# Patient Record
Sex: MALE | Race: OTHER
[De-identification: names, ages, dates, MRNs, and addresses within clinical notes are randomized per-mention and may not be internally consistent; named-entity substitution may affect disease eponyms.]

---

## 2023-04-30 ENCOUNTER — HOSPITAL ENCOUNTER (EMERGENCY)
Dept: HOSPITAL 54 - ER | Age: 53
Discharge: NURSING, REHAB OR LONG-TERM CARE | End: 2023-04-30
Payer: COMMERCIAL

## 2023-04-30 VITALS — HEIGHT: 67 IN | WEIGHT: 184 LBS | BODY MASS INDEX: 28.88 KG/M2

## 2023-04-30 VITALS — SYSTOLIC BLOOD PRESSURE: 148 MMHG | DIASTOLIC BLOOD PRESSURE: 100 MMHG

## 2023-04-30 DIAGNOSIS — G40.909: Primary | ICD-10-CM

## 2023-04-30 DIAGNOSIS — F20.9: ICD-10-CM

## 2023-04-30 DIAGNOSIS — K21.9: ICD-10-CM

## 2023-04-30 DIAGNOSIS — F31.9: ICD-10-CM

## 2023-04-30 LAB
ALBUMIN SERPL BCP-MCNC: 3.4 G/DL (ref 3.4–5)
ALP SERPL-CCNC: 123 U/L (ref 46–116)
ALT SERPL W P-5'-P-CCNC: 25 U/L (ref 12–78)
AST SERPL W P-5'-P-CCNC: 34 U/L (ref 15–37)
BASOPHILS # BLD AUTO: 0 K/UL (ref 0–0.2)
BASOPHILS NFR BLD AUTO: 0.5 % (ref 0–2)
BILIRUB DIRECT SERPL-MCNC: 0.2 MG/DL (ref 0–0.2)
BILIRUB SERPL-MCNC: 0.8 MG/DL (ref 0.2–1)
BUN SERPL-MCNC: 16 MG/DL (ref 7–18)
CALCIUM SERPL-MCNC: 9.2 MG/DL (ref 8.5–10.1)
CHLORIDE SERPL-SCNC: 107 MMOL/L (ref 98–107)
CO2 SERPL-SCNC: 18 MMOL/L (ref 21–32)
CREAT SERPL-MCNC: 1.2 MG/DL (ref 0.6–1.3)
EOSINOPHIL NFR BLD AUTO: 1.8 % (ref 0–6)
ETHANOL SERPL-MCNC: < 3 MG/DL (ref 0–0)
GLUCOSE SERPL-MCNC: 127 MG/DL (ref 74–106)
HCT VFR BLD AUTO: 40 % (ref 39–51)
HGB BLD-MCNC: 13.1 G/DL (ref 13.5–17.5)
LYMPHOCYTES NFR BLD AUTO: 2.1 K/UL (ref 0.8–4.8)
LYMPHOCYTES NFR BLD AUTO: 33.9 % (ref 20–44)
MCHC RBC AUTO-ENTMCNC: 33 G/DL (ref 31–36)
MCV RBC AUTO: 93 FL (ref 80–96)
MONOCYTES NFR BLD AUTO: 0.7 K/UL (ref 0.1–1.3)
MONOCYTES NFR BLD AUTO: 11.4 % (ref 2–12)
NEUTROPHILS # BLD AUTO: 3.2 K/UL (ref 1.8–8.9)
NEUTROPHILS NFR BLD AUTO: 52.4 % (ref 43–81)
PLATELET # BLD AUTO: 129 K/UL (ref 150–450)
POTASSIUM SERPL-SCNC: 3.2 MMOL/L (ref 3.5–5.1)
PROT SERPL-MCNC: 7.4 G/DL (ref 6.4–8.2)
RBC # BLD AUTO: 4.28 MIL/UL (ref 4.5–6)
SODIUM SERPL-SCNC: 141 MMOL/L (ref 136–145)
VALPROATE SERPL-MCNC: 4 UG/ML (ref 50–100)
WBC NRBC COR # BLD AUTO: 6.1 K/UL (ref 4.3–11)

## 2023-04-30 PROCEDURE — 93005 ELECTROCARDIOGRAM TRACING: CPT

## 2023-04-30 PROCEDURE — 96365 THER/PROPH/DIAG IV INF INIT: CPT

## 2023-04-30 PROCEDURE — 80048 BASIC METABOLIC PNL TOTAL CA: CPT

## 2023-04-30 PROCEDURE — 80076 HEPATIC FUNCTION PANEL: CPT

## 2023-04-30 PROCEDURE — 85025 COMPLETE CBC W/AUTO DIFF WBC: CPT

## 2023-04-30 PROCEDURE — 80164 ASSAY DIPROPYLACETIC ACD TOT: CPT

## 2023-04-30 PROCEDURE — G0480 DRUG TEST DEF 1-7 CLASSES: HCPCS

## 2023-04-30 PROCEDURE — 99284 EMERGENCY DEPT VISIT MOD MDM: CPT

## 2023-04-30 PROCEDURE — 82962 GLUCOSE BLOOD TEST: CPT

## 2023-04-30 PROCEDURE — 80307 DRUG TEST PRSMV CHEM ANLYZR: CPT

## 2023-04-30 PROCEDURE — 80320 DRUG SCREEN QUANTALCOHOLS: CPT

## 2023-04-30 PROCEDURE — 36415 COLL VENOUS BLD VENIPUNCTURE: CPT

## 2023-04-30 NOTE — NUR
Blanca, from [facility] snf, had seizure episode x 2, Versed 5 mg 
given on scene , no oral trauma. Pt A/Ox0; post-ictal. Responds to pain 
stimuli. Tolerating R/A at 89%. Put on O2 3lpm satting at 95%. Connected pt to 
POX and monitor. Safety seizure precaution measures in place.

## 2023-04-30 NOTE — NUR
REPORT GIVEN TO BOB RN SUPERVISOR FROM [facility] FOR [facility]

## 2023-05-18 ENCOUNTER — HOSPITAL ENCOUNTER (EMERGENCY)
Dept: HOSPITAL 12 - ER | Age: 53
Discharge: NURSING, REHAB OR LONG-TERM CARE | End: 2023-05-18
Payer: COMMERCIAL

## 2023-05-18 VITALS — WEIGHT: 149 LBS | BODY MASS INDEX: 25.44 KG/M2 | HEIGHT: 64 IN

## 2023-05-18 VITALS — SYSTOLIC BLOOD PRESSURE: 120 MMHG | DIASTOLIC BLOOD PRESSURE: 69 MMHG

## 2023-05-18 DIAGNOSIS — R46.89: Primary | ICD-10-CM

## 2023-05-18 PROCEDURE — A4663 DIALYSIS BLOOD PRESSURE CUFF: HCPCS

## 2023-05-18 NOTE — NUR
Patient discharged to [facility] in stable condition with [facility] unit 350.  
Written and verbal after care instructions given. APA verbalizes understanding 
of instructions. Stressed follow up or return to ER for worsening s/s.

## 2023-05-18 NOTE — NUR
Informed [facility] that patient will be returning to facility.

## 2025-07-07 ENCOUNTER — HOSPITAL ENCOUNTER (INPATIENT)
Dept: HOSPITAL 54 - ER | Age: 55
LOS: 2 days | Discharge: NURSING, REHAB OR LONG-TERM CARE | DRG: 53 | End: 2025-07-09
Attending: INTERNAL MEDICINE | Admitting: INTERNAL MEDICINE
Payer: COMMERCIAL

## 2025-07-07 VITALS — WEIGHT: 140.03 LBS | HEIGHT: 64 IN | BODY MASS INDEX: 23.91 KG/M2

## 2025-07-07 VITALS — TEMPERATURE: 97.7 F | SYSTOLIC BLOOD PRESSURE: 115 MMHG | OXYGEN SATURATION: 98 % | DIASTOLIC BLOOD PRESSURE: 74 MMHG

## 2025-07-07 VITALS — SYSTOLIC BLOOD PRESSURE: 109 MMHG | TEMPERATURE: 97.9 F | OXYGEN SATURATION: 98 % | DIASTOLIC BLOOD PRESSURE: 80 MMHG

## 2025-07-07 DIAGNOSIS — F20.9: ICD-10-CM

## 2025-07-07 DIAGNOSIS — K74.60: ICD-10-CM

## 2025-07-07 DIAGNOSIS — F03.93: ICD-10-CM

## 2025-07-07 DIAGNOSIS — E87.6: ICD-10-CM

## 2025-07-07 DIAGNOSIS — G93.49: ICD-10-CM

## 2025-07-07 DIAGNOSIS — R53.2: ICD-10-CM

## 2025-07-07 DIAGNOSIS — Z98.890: ICD-10-CM

## 2025-07-07 DIAGNOSIS — F31.9: ICD-10-CM

## 2025-07-07 DIAGNOSIS — K21.9: ICD-10-CM
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
## 2025-07-07 DIAGNOSIS — S01.81XA: ICD-10-CM

## 2025-07-07 DIAGNOSIS — I25.10: ICD-10-CM

## 2025-07-07 DIAGNOSIS — G40.909: Primary | ICD-10-CM
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
## 2025-07-07 DIAGNOSIS — S01.01XA: ICD-10-CM

## 2025-07-07 DIAGNOSIS — Z86.73: ICD-10-CM

## 2025-07-07 LAB
ALBUMIN SERPL BCP-MCNC: 3.1 G/DL (ref 3.4–5)
ALP SERPL-CCNC: 101 U/L (ref 46–116)
ALT SERPL W P-5'-P-CCNC: 7 U/L (ref 12–78)
ANISOCYTOSIS BLD QL: (no result)
APTT PPP: 26.6 SEC (ref 24.3–34.3)
AST SERPL W P-5'-P-CCNC: 18 U/L (ref 15–37)
AUER BODIES BLD QL SMEAR: (no result)
BASOPHILS # BLD AUTO: 0 K/UL (ref 0–0.2)
BASOPHILS NFR BLD AUTO: 0.3 % (ref 0–2)
BILIRUB DIRECT SERPL-MCNC: 0.2 MG/DL (ref 0–0.2)
BILIRUB SERPL-MCNC: 0.9 MG/DL (ref 0.2–1)
BUN SERPL-MCNC: 11 MG/DL (ref 7–18)
CABOT RINGS BLD QL SMEAR: (no result)
CALCIUM SERPL-MCNC: 8.6 MG/DL (ref 8.5–10.1)
CHLORIDE SERPL-SCNC: 104 MMOL/L (ref 98–107)
CO2 SERPL-SCNC: 27 MMOL/L (ref 21–32)
CREAT SERPL-MCNC: 0.9 MG/DL (ref 0.6–1.3)
DACRYOCYTES BLD QL SMEAR: (no result)
DOHLE BOD BLD QL SMEAR: (no result)
EOSINOPHIL # BLD AUTO: 0.2 K/UL (ref 0–0.7)
EOSINOPHIL NFR BLD AUTO: 3.8 % (ref 0–6)
ERYTHROCYTE [DISTWIDTH] IN BLOOD BY AUTOMATED COUNT: 12.9 % (ref 11.5–15)
GLUCOSE SERPL-MCNC: 112 MG/DL (ref 74–106)
HCT VFR BLD AUTO: 44 % (ref 39–51)
HELMET CELLS BLD QL SMEAR: (no result)
HGB BLD-MCNC: 15 G/DL (ref 13.5–17.5)
HOWELL-JOLLY BOD BLD QL SMEAR: (no result)
HYPOCHROMIA BLD QL: (no result)
INR PPP: 1.07 (ref 0.91–1.1)
LYMPHOCYTES NFR BLD AUTO: 1.6 K/UL (ref 0.8–4.8)
LYMPHOCYTES NFR BLD AUTO: 25.4 % (ref 20–44)
MACROCYTES BLD QL: (no result)
MCH RBC QN AUTO: 35 PG (ref 26–33)
MCHC RBC AUTO-ENTMCNC: 34 G/DL (ref 31–36)
MCV RBC AUTO: 100 FL (ref 80–96)
MONOCYTES NFR BLD AUTO: 0.8 K/UL (ref 0.1–1.3)
MONOCYTES NFR BLD AUTO: 12.4 % (ref 2–12)
NEUTROPHILS # BLD AUTO: 3.6 K/UL (ref 1.8–8.9)
NEUTROPHILS NFR BLD AUTO: 58.1 % (ref 43–81)
OVALOCYTES BLD QL SMEAR: (no result)
PAPPENHEIMER BOD BLD QL SMEAR: (no result)
PLATELET # BLD AUTO: 169 K/UL (ref 150–450)
PLATELET BLD QL SMEAR: (no result)
POTASSIUM SERPL-SCNC: 3 MMOL/L (ref 3.5–5.1)
PROT SERPL-MCNC: 7.5 G/DL (ref 6.4–8.2)
PROTHROMBIN TIME: 11.3 SECS (ref 9.2–11.1)
RBC # BLD AUTO: 4.34 MIL/UL (ref 4.5–6)
ROULEAUX BLD QL SMEAR: (no result)
SODIUM SERPL-SCNC: 141 MMOL/L (ref 136–145)
STOMATOCYTES BLD QL SMEAR: (no result)
TARGETS BLD QL SMEAR: (no result)
TOXIC GRANULES BLD QL SMEAR: (no result)
WBC NRBC COR # BLD AUTO: 6.2 K/UL (ref 4.3–11)

## 2025-07-07 PROCEDURE — A6403 STERILE GAUZE>16 <= 48 SQ IN: HCPCS

## 2025-07-07 PROCEDURE — A4223 INFUSION SUPPLIES W/O PUMP: HCPCS

## 2025-07-07 PROCEDURE — G0378 HOSPITAL OBSERVATION PER HR: HCPCS

## 2025-07-07 RX ADMIN — FUROSEMIDE SCH MG: 40 TABLET ORAL at 17:13

## 2025-07-07 RX ADMIN — OLANZAPINE SCH MG: 2.5 TABLET ORAL at 17:13

## 2025-07-07 RX ADMIN — DIVALPROEX SODIUM SCH MG: 250 TABLET, DELAYED RELEASE ORAL at 17:13

## 2025-07-07 RX ADMIN — LEVETIRACETAM SCH MG: 100 SOLUTION ORAL at 21:49

## 2025-07-07 RX ADMIN — DOCUSATE SODIUM SCH MG: 50 LIQUID ORAL at 17:13

## 2025-07-07 RX ADMIN — POTASSIUM CHLORIDE ONE MEQ: 1500 TABLET, EXTENDED RELEASE ORAL at 12:22

## 2025-07-07 RX ADMIN — HEPARIN SODIUM SCH UNITS: 5000 INJECTION INTRAVENOUS; SUBCUTANEOUS at 22:02

## 2025-07-07 RX ADMIN — SODIUM CHLORIDE SCH MLS/HR: 9 INJECTION, SOLUTION INTRAVENOUS at 12:22

## 2025-07-07 RX ADMIN — LACOSAMIDE SCH MG: 50 TABLET, FILM COATED ORAL at 17:13

## 2025-07-08 VITALS — TEMPERATURE: 98.1 F | DIASTOLIC BLOOD PRESSURE: 74 MMHG | SYSTOLIC BLOOD PRESSURE: 110 MMHG | OXYGEN SATURATION: 99 %

## 2025-07-08 VITALS — TEMPERATURE: 97.9 F | DIASTOLIC BLOOD PRESSURE: 77 MMHG | SYSTOLIC BLOOD PRESSURE: 126 MMHG | OXYGEN SATURATION: 96 %

## 2025-07-08 VITALS — OXYGEN SATURATION: 98 % | TEMPERATURE: 97.7 F | SYSTOLIC BLOOD PRESSURE: 106 MMHG | DIASTOLIC BLOOD PRESSURE: 75 MMHG

## 2025-07-08 VITALS — DIASTOLIC BLOOD PRESSURE: 75 MMHG | OXYGEN SATURATION: 98 % | SYSTOLIC BLOOD PRESSURE: 106 MMHG | TEMPERATURE: 97.7 F

## 2025-07-08 LAB
ALBUMIN SERPL BCP-MCNC: 3.1 G/DL (ref 3.4–5)
ALP SERPL-CCNC: 105 U/L (ref 46–116)
ALT SERPL W P-5'-P-CCNC: 15 U/L (ref 12–78)
ANISOCYTOSIS BLD QL: (no result)
AST SERPL W P-5'-P-CCNC: 17 U/L (ref 15–37)
AUER BODIES BLD QL SMEAR: (no result)
BASOPHILS # BLD AUTO: 0 K/UL (ref 0–0.2)
BASOPHILS NFR BLD AUTO: 0.3 % (ref 0–2)
BILIRUB SERPL-MCNC: 0.8 MG/DL (ref 0.2–1)
BUN SERPL-MCNC: 13 MG/DL (ref 7–18)
CABOT RINGS BLD QL SMEAR: (no result)
CALCIUM SERPL-MCNC: 8.7 MG/DL (ref 8.5–10.1)
CHLORIDE SERPL-SCNC: 108 MMOL/L (ref 98–107)
CO2 SERPL-SCNC: 25 MMOL/L (ref 21–32)
CREAT SERPL-MCNC: 0.7 MG/DL (ref 0.6–1.3)
DACRYOCYTES BLD QL SMEAR: (no result)
DOHLE BOD BLD QL SMEAR: (no result)
EOSINOPHIL # BLD AUTO: 0.2 K/UL (ref 0–0.7)
EOSINOPHIL NFR BLD AUTO: 4.2 % (ref 0–6)
ERYTHROCYTE [DISTWIDTH] IN BLOOD BY AUTOMATED COUNT: 13.3 % (ref 11.5–15)
GLUCOSE SERPL-MCNC: 86 MG/DL (ref 74–106)
HCT VFR BLD AUTO: 46 % (ref 39–51)
HELMET CELLS BLD QL SMEAR: (no result)
HGB BLD-MCNC: 15.5 G/DL (ref 13.5–17.5)
HOWELL-JOLLY BOD BLD QL SMEAR: (no result)
HYPOCHROMIA BLD QL: (no result)
LYMPHOCYTES NFR BLD AUTO: 2.3 K/UL (ref 0.8–4.8)
LYMPHOCYTES NFR BLD AUTO: 43.3 % (ref 20–44)
MACROCYTES BLD QL: (no result)
MAGNESIUM SERPL-MCNC: 2.2 MG/DL (ref 1.8–2.4)
MCH RBC QN AUTO: 34 PG (ref 26–33)
MCHC RBC AUTO-ENTMCNC: 34 G/DL (ref 31–36)
MCV RBC AUTO: 101 FL (ref 80–96)
MONOCYTES NFR BLD AUTO: 0.7 K/UL (ref 0.1–1.3)
MONOCYTES NFR BLD AUTO: 12.4 % (ref 2–12)
NEUTROPHILS # BLD AUTO: 2.1 K/UL (ref 1.8–8.9)
NEUTROPHILS NFR BLD AUTO: 39.8 % (ref 43–81)
OVALOCYTES BLD QL SMEAR: (no result)
PAPPENHEIMER BOD BLD QL SMEAR: (no result)
PHOSPHATE SERPL-MCNC: 2.6 MG/DL (ref 2.5–4.9)
PLATELET # BLD AUTO: 186 K/UL (ref 150–450)
PLATELET BLD QL SMEAR: (no result)
POTASSIUM SERPL-SCNC: 3.5 MMOL/L (ref 3.5–5.1)
PROT SERPL-MCNC: 7.5 G/DL (ref 6.4–8.2)
RBC # BLD AUTO: 4.51 MIL/UL (ref 4.5–6)
ROULEAUX BLD QL SMEAR: (no result)
SODIUM SERPL-SCNC: 145 MMOL/L (ref 136–145)
STOMATOCYTES BLD QL SMEAR: (no result)
TARGETS BLD QL SMEAR: (no result)
TOXIC GRANULES BLD QL SMEAR: (no result)
WBC NRBC COR # BLD AUTO: 5.3 K/UL (ref 4.3–11)

## 2025-07-08 RX ADMIN — LEVETIRACETAM SCH MG: 100 SOLUTION ORAL at 09:18

## 2025-07-08 RX ADMIN — OLANZAPINE PRN MG: 10 INJECTION, POWDER, FOR SOLUTION INTRAMUSCULAR at 10:03

## 2025-07-08 RX ADMIN — POLYETHYLENE GLYCOL 3350 SCH GM: 17 POWDER, FOR SOLUTION ORAL at 09:00

## 2025-07-08 RX ADMIN — Medication SCH MG: at 09:00

## 2025-07-08 RX ADMIN — METOPROLOL TARTRATE SCH MG: 50 TABLET, FILM COATED ORAL at 09:00

## 2025-07-09 VITALS — SYSTOLIC BLOOD PRESSURE: 126 MMHG | OXYGEN SATURATION: 96 % | DIASTOLIC BLOOD PRESSURE: 77 MMHG | TEMPERATURE: 97.9 F

## 2025-07-09 VITALS — SYSTOLIC BLOOD PRESSURE: 126 MMHG | DIASTOLIC BLOOD PRESSURE: 83 MMHG | TEMPERATURE: 97.7 F | OXYGEN SATURATION: 96 %

## 2025-07-09 VITALS — SYSTOLIC BLOOD PRESSURE: 123 MMHG | DIASTOLIC BLOOD PRESSURE: 83 MMHG
